# Patient Record
Sex: FEMALE | HISPANIC OR LATINO | Employment: FULL TIME | URBAN - METROPOLITAN AREA
[De-identification: names, ages, dates, MRNs, and addresses within clinical notes are randomized per-mention and may not be internally consistent; named-entity substitution may affect disease eponyms.]

---

## 2022-09-16 ENCOUNTER — OFFICE VISIT (OUTPATIENT)
Dept: GASTROENTEROLOGY | Facility: CLINIC | Age: 42
End: 2022-09-16
Payer: COMMERCIAL

## 2022-09-16 VITALS
TEMPERATURE: 98.6 F | DIASTOLIC BLOOD PRESSURE: 78 MMHG | WEIGHT: 213.4 LBS | HEIGHT: 65 IN | SYSTOLIC BLOOD PRESSURE: 120 MMHG | BODY MASS INDEX: 35.56 KG/M2

## 2022-09-16 DIAGNOSIS — R14.0 ABDOMINAL BLOATING: Primary | ICD-10-CM

## 2022-09-16 DIAGNOSIS — K21.9 GASTROESOPHAGEAL REFLUX DISEASE WITHOUT ESOPHAGITIS: ICD-10-CM

## 2022-09-16 PROCEDURE — 99204 OFFICE O/P NEW MOD 45 MIN: CPT | Performed by: INTERNAL MEDICINE

## 2022-09-16 RX ORDER — PANTOPRAZOLE SODIUM 20 MG/1
20 TABLET, DELAYED RELEASE ORAL DAILY
COMMUNITY
Start: 2022-08-18 | End: 2022-09-16

## 2022-09-16 RX ORDER — PANTOPRAZOLE SODIUM 40 MG/1
40 TABLET, DELAYED RELEASE ORAL DAILY
Qty: 60 TABLET | Refills: 3 | Status: SHIPPED | OUTPATIENT
Start: 2022-09-16 | End: 2022-10-13

## 2022-09-16 RX ORDER — LOSARTAN POTASSIUM 50 MG/1
75 TABLET ORAL DAILY
COMMUNITY
Start: 2022-06-24

## 2022-09-16 NOTE — PROGRESS NOTES
[facility] - Outpatient Consultation  Jessica Johnston 43 y o  female MRN: [id number]  Encounter: [id number]          ASSESSMENT AND PLAN:    Ethan Mcguire is a 43 y o  female with HTN presenting for emesis, burning chest pain, abdominal bloating  GERD  - Above sx most likely due to GERD, as exacerbated by trigger foods, relieved by abstinence from these foods  - Counseled on dietary, lifestyle changes   - Protonix 40 mg daily trial  - No alarm signs warranting EGD at this time, but if failure to improve or worsening/recurrence of nocturnal emesis would plan for this     Abdominal Bloating  - Most likely 2/2 GERD as above  - R/o celiac with serum panel    1  Abdominal bloating    2  Gastroesophageal reflux disease without esophagitis        Orders Placed This Encounter   Procedures    Celiac Disease Panel     ______________________________________________________________________    HPI:    Ethan Mcguire is a 43 y o  female with PMH of HTN presenting for emesis, burning chest pain  Pt has been experiencing waking up at night with reflux sx including burning chest pain and occasional small volume emesis  Notes that sometimes she wakes up with emesis in her mouth at night from choking on it and having dyspnea  Also endorsing bloating post meals, especially in afternoon, and periodic cough  These symptoms are worse with spicy foods, chocolate, coffee  Symptoms better with abstinence from these foods  No weight loss, no change in BMs  No current NSAID use, only uses tylenol for headaches after her PCP recommended against NSAIDs  Denies any alcohol use or FH of GI problems  No prior EGD/colonoscopy  REVIEW OF SYSTEMS:    CONSTITUTIONAL: Denies any fever, chills, rigors, and weight loss  HEENT: No earache or tinnitus  Denies hearing loss or visual disturbances  CARDIOVASCULAR: No chest pain or palpitations     RESPIRATORY: Denies any cough, hemoptysis, shortness of breath or dyspnea on exertion  GASTROINTESTINAL: As noted in the History of Present Illness  GENITOURINARY: No problems with urination  Denies any hematuria or dysuria  NEUROLOGIC: No dizziness or vertigo, denies headaches  MUSCULOSKELETAL: Denies any muscle or joint pain  SKIN: Denies skin rashes or itching  ENDOCRINE: Denies excessive thirst  Denies intolerance to heat or cold  PSYCHOSOCIAL: Denies depression or anxiety  Denies any recent memory loss  Historical Information   History reviewed  No pertinent past medical history  History reviewed  No pertinent surgical history  Social History   Social History     Substance and Sexual Activity   Alcohol Use Not Currently     Social History     Substance and Sexual Activity   Drug Use Never     Social History     Tobacco Use   Smoking Status Never Smoker   Smokeless Tobacco Never Used     Family History   Problem Relation Age of Onset    Colon cancer Maternal Uncle        Meds/Allergies       Current Outpatient Medications:     losartan (COZAAR) 50 mg tablet    pantoprazole (PROTONIX) 20 mg tablet    Allergies   Allergen Reactions    Lisinopril Cough           Objective     Blood pressure 120/78, temperature 98 6 °F (37 °C), temperature source Tympanic, height 5' 5" (1 651 m), weight 96 8 kg (213 lb 6 4 oz)  Body mass index is 35 51 kg/m²  Wt Readings from Last 3 Encounters:   09/16/22 96 8 kg (213 lb 6 4 oz)        PHYSICAL EXAM:      General Appearance:   Alert, cooperative, no distress   HEENT:   Normocephalic, atraumatic, anicteric  Neck:  Supple, symmetrical, trachea midline   Lungs:   Clear to auscultation bilaterally; no rales, rhonchi or wheezing; respirations unlabored    Heart[de-identified]   Regular rate and rhythm; no murmur, rub, or gallop     Abdomen:   Soft, non-tender, non-distended; normal bowel sounds; no masses, no organomegaly    Genitalia:   Deferred    Rectal:   Deferred    Extremities:  No cyanosis, clubbing or edema    Pulses:  2+ and symmetric    Skin:  No jaundice, rashes, or lesions    Lymph nodes:  No palpable cervical lymphadenopathy        Lab Results:     No results for input(s): SODIUM, K, CL, CO2, BUN, CREATININE, GLUC, CALCIUM, MG, PHOS in the last 90241 hours  No results for input(s): TP, ALB, PREALBUMIN, TBILI, BILIDIR, IBILI, AST, ALT, ALKPHOS, GGT in the last 35342 hours  No results for input(s): WBC, HGB, HCT, PLT, MCV, MCH, MCHC, RDW, MPV, NRBC, NEUTOPHILPCT, IMMGRANS, LYMPHOPCT, MONOPCT, EOSPCT, BASOPCT, NEUTROABS, IMMGRANSABS, LYMPHSABS, MONOSABS, EOSABS, BASOSABS in the last 82757 hours  No results for input(s): IRON, TRANSFERRIN, TRANSFERSAT, FERRITIN, RETIC in the last 65841 hours  No results found for: CRP    No results found for: [id number], TSH    Radiology Results:   No results found      Gastroenterological Procedures:       Richardine Riedel MD  PGY-4 Gastroenterology Fellow  9/16/2022 9:27 AM

## 2022-10-13 DIAGNOSIS — K21.9 GASTROESOPHAGEAL REFLUX DISEASE WITHOUT ESOPHAGITIS: ICD-10-CM

## 2022-10-13 RX ORDER — PANTOPRAZOLE SODIUM 40 MG/1
TABLET, DELAYED RELEASE ORAL
Qty: 90 TABLET | Refills: 3 | Status: SHIPPED | OUTPATIENT
Start: 2022-10-13

## 2024-03-12 ENCOUNTER — OCCMED (OUTPATIENT)
Dept: URGENT CARE | Facility: CLINIC | Age: 44
End: 2024-03-12

## 2024-03-12 ENCOUNTER — APPOINTMENT (OUTPATIENT)
Dept: LAB | Facility: CLINIC | Age: 44
End: 2024-03-12

## 2024-03-12 DIAGNOSIS — Z02.1 PRE-EMPLOYMENT EXAMINATION: Primary | ICD-10-CM

## 2024-03-12 DIAGNOSIS — Z02.1 PRE-EMPLOYMENT EXAMINATION: ICD-10-CM

## 2024-03-12 LAB — RUBV IGG SERPL IA-ACNC: >450 IU/ML

## 2024-03-12 PROCEDURE — 86765 RUBEOLA ANTIBODY: CPT

## 2024-03-12 PROCEDURE — 86480 TB TEST CELL IMMUN MEASURE: CPT

## 2024-03-12 PROCEDURE — 86762 RUBELLA ANTIBODY: CPT

## 2024-03-12 PROCEDURE — 86735 MUMPS ANTIBODY: CPT

## 2024-03-12 PROCEDURE — 36415 COLL VENOUS BLD VENIPUNCTURE: CPT

## 2024-03-12 PROCEDURE — 86787 VARICELLA-ZOSTER ANTIBODY: CPT

## 2024-03-13 LAB
GAMMA INTERFERON BACKGROUND BLD IA-ACNC: 0.06 IU/ML
M TB IFN-G BLD-IMP: NEGATIVE
M TB IFN-G CD4+ BCKGRND COR BLD-ACNC: -0.01 IU/ML
M TB IFN-G CD4+ BCKGRND COR BLD-ACNC: 0.01 IU/ML
MEV IGG SER QL IA: NORMAL
MITOGEN IGNF BCKGRD COR BLD-ACNC: 9.94 IU/ML
MUV IGG SER QL IA: NORMAL
VZV IGG SER QL IA: NORMAL

## 2024-07-09 ENCOUNTER — TELEPHONE (OUTPATIENT)
Age: 44
End: 2024-07-09

## 2024-07-09 NOTE — TELEPHONE ENCOUNTER
Pt would like to make an appt for a surgical consult.  Office was not available so I told Pt that I would have some reach out to her.    Pt BMI: 38.3  - I explained to Pt BMI needs to be over 40   Pt said she has other health issues that should make her a candidate, she is diabetic, has high blood pressure and fatty liver.      I told pt I would let the office know and they will reach out to her

## 2024-07-09 NOTE — TELEPHONE ENCOUNTER
Patient was scheduled for surgical consult, however, when patient contacted her insurance they do NOT have Bariatric Coverage.  Patient was informed can schedule with medical instead of surgical, patient not interested at this time.    Patient did ask if there are any insurances that cover bariatric surgery, informed patient that coverage is based upon the contract employer has with insurance company and cannot inform what insurance would be better.  Patient would have to contact insurance companies directly and/or contact her HR department.

## 2024-07-09 NOTE — TELEPHONE ENCOUNTER
Patient calling back and asking reschedule her surgical cosult with Dr. Fredis Ling     Tried calling office but line was busy     Please call patient back to reschedule